# Patient Record
Sex: FEMALE | Race: ASIAN | NOT HISPANIC OR LATINO | URBAN - METROPOLITAN AREA
[De-identification: names, ages, dates, MRNs, and addresses within clinical notes are randomized per-mention and may not be internally consistent; named-entity substitution may affect disease eponyms.]

---

## 2024-04-01 ENCOUNTER — OUTPATIENT (OUTPATIENT)
Dept: OUTPATIENT SERVICES | Facility: HOSPITAL | Age: 59
LOS: 1 days | End: 2024-04-01
Payer: COMMERCIAL

## 2024-04-01 DIAGNOSIS — R10.2 PELVIC AND PERINEAL PAIN: ICD-10-CM

## 2024-04-01 DIAGNOSIS — Z00.8 ENCOUNTER FOR OTHER GENERAL EXAMINATION: ICD-10-CM

## 2024-04-01 DIAGNOSIS — Z12.31 ENCOUNTER FOR SCREENING MAMMOGRAM FOR MALIGNANT NEOPLASM OF BREAST: ICD-10-CM

## 2024-04-01 PROCEDURE — 77063 BREAST TOMOSYNTHESIS BI: CPT | Mod: 26

## 2024-04-01 PROCEDURE — 76856 US EXAM PELVIC COMPLETE: CPT

## 2024-04-01 PROCEDURE — 76856 US EXAM PELVIC COMPLETE: CPT | Mod: 26

## 2024-04-01 PROCEDURE — 77067 SCR MAMMO BI INCL CAD: CPT | Mod: 26

## 2024-04-01 PROCEDURE — 77063 BREAST TOMOSYNTHESIS BI: CPT

## 2024-04-01 PROCEDURE — 76830 TRANSVAGINAL US NON-OB: CPT

## 2024-04-01 PROCEDURE — 77067 SCR MAMMO BI INCL CAD: CPT

## 2024-04-01 PROCEDURE — 76830 TRANSVAGINAL US NON-OB: CPT | Mod: 26

## 2024-04-02 DIAGNOSIS — Z12.31 ENCOUNTER FOR SCREENING MAMMOGRAM FOR MALIGNANT NEOPLASM OF BREAST: ICD-10-CM

## 2024-04-02 DIAGNOSIS — R10.2 PELVIC AND PERINEAL PAIN: ICD-10-CM

## 2024-06-03 PROBLEM — Z00.00 ENCOUNTER FOR PREVENTIVE HEALTH EXAMINATION: Status: ACTIVE | Noted: 2024-06-03

## 2024-06-05 ENCOUNTER — APPOINTMENT (OUTPATIENT)
Dept: NEUROLOGY | Facility: CLINIC | Age: 59
End: 2024-06-05
Payer: COMMERCIAL

## 2024-06-05 VITALS — WEIGHT: 103 LBS | HEIGHT: 60 IN | BODY MASS INDEX: 20.22 KG/M2

## 2024-06-05 VITALS — HEART RATE: 67 BPM | DIASTOLIC BLOOD PRESSURE: 67 MMHG | SYSTOLIC BLOOD PRESSURE: 106 MMHG

## 2024-06-05 DIAGNOSIS — M54.12 RADICULOPATHY, CERVICAL REGION: ICD-10-CM

## 2024-06-05 DIAGNOSIS — M25.519 PAIN IN UNSPECIFIED SHOULDER: ICD-10-CM

## 2024-06-05 DIAGNOSIS — M25.511 PAIN IN RIGHT SHOULDER: ICD-10-CM

## 2024-06-05 PROCEDURE — 99204 OFFICE O/P NEW MOD 45 MIN: CPT

## 2024-06-05 RX ORDER — ASPIRIN 81 MG
81 TABLET,CHEWABLE ORAL
Refills: 0 | Status: ACTIVE | COMMUNITY

## 2024-06-05 RX ORDER — CHOLECALCIFEROL (VITAMIN D3) 10 MCG
TABLET ORAL
Refills: 0 | Status: ACTIVE | COMMUNITY

## 2024-06-05 RX ORDER — COLD-HOT PACK
EACH MISCELLANEOUS
Refills: 0 | Status: ACTIVE | COMMUNITY

## 2024-06-05 RX ORDER — CHROMIUM 200 MCG
TABLET ORAL
Refills: 0 | Status: ACTIVE | COMMUNITY

## 2024-06-05 RX ORDER — ROSUVASTATIN CALCIUM 5 MG/1
5 TABLET, FILM COATED ORAL
Refills: 0 | Status: ACTIVE | COMMUNITY

## 2024-06-05 NOTE — PHYSICAL EXAM

## 2024-06-05 NOTE — ASSESSMENT
[FreeTextEntry1] : Acute on chronic cervical radiculopathy - X-ray of the cervical spine - MRI of the cervical spine - EMG/NCS of the upper extremities - Trial of compound cream.  I have given her some samples and if she finds it helpful she will call back for an extra prescription -A trial of physical therapy  Right shoulder pain-possible shoulder impingement syndrome - Trial of physical therapy - If not better, should consult orthopedics

## 2024-06-05 NOTE — HISTORY OF PRESENT ILLNESS
[FreeTextEntry1] : It's a pleasure to see Ms. ASHLIE MULLEN In the office today. She is a 58 year -  old [place] woman  who presents to the office today for the evaluation of acute on chronic cervical radiculopathy.  Patient reports that she has a history of neck pain with pain radiating down her right arm.  Many years ago she was seen by a doctor in [facility] and only had x-ray of the cervical spine which she was told degenerative changes.  She did go for therapy that helped.  A month ago she started having numbness and radiating pain in the right hand that gradually as send up the arm and last week she woke up with stiffness of the neck as well as pain in the right shoulder.  She denies any significant weakness, numbness, urinary/bowel incontinence.  Patient and her  have been living in [place] for the last 30 years and recently move back to the [facility] just 6 months ago to be with their kids.

## 2024-06-12 ENCOUNTER — APPOINTMENT (OUTPATIENT)
Dept: NEUROLOGY | Facility: CLINIC | Age: 59
End: 2024-06-12
Payer: COMMERCIAL

## 2024-06-12 PROCEDURE — 95886 MUSC TEST DONE W/N TEST COMP: CPT

## 2024-06-12 PROCEDURE — 95911 NRV CNDJ TEST 9-10 STUDIES: CPT

## 2024-07-22 ENCOUNTER — RESULT REVIEW (OUTPATIENT)
Age: 59
End: 2024-07-22

## 2024-07-25 ENCOUNTER — APPOINTMENT (OUTPATIENT)
Dept: NEUROLOGY | Facility: CLINIC | Age: 59
End: 2024-07-25
Payer: COMMERCIAL

## 2024-07-25 VITALS — HEIGHT: 60 IN | BODY MASS INDEX: 20.22 KG/M2 | WEIGHT: 103 LBS

## 2024-07-25 VITALS — SYSTOLIC BLOOD PRESSURE: 113 MMHG | DIASTOLIC BLOOD PRESSURE: 69 MMHG | HEART RATE: 71 BPM

## 2024-07-25 DIAGNOSIS — M25.511 PAIN IN RIGHT SHOULDER: ICD-10-CM

## 2024-07-25 DIAGNOSIS — M54.12 RADICULOPATHY, CERVICAL REGION: ICD-10-CM

## 2024-07-25 PROCEDURE — 99213 OFFICE O/P EST LOW 20 MIN: CPT

## 2024-07-25 NOTE — ASSESSMENT
[FreeTextEntry1] : Acute on chronic cervical radiculopathy-much improved -X-ray, MRI of the cervical spine, EMG/NCS of the upper extremities reviewed and discussed with patient.  Images shown on computer. - Continue physical therapy  Right shoulder pain-possible shoulder impingement syndrome -much improved -Continue physical therapy - If recurrent, should consider seeing orthopedics

## 2024-07-25 NOTE — HISTORY OF PRESENT ILLNESS
[FreeTextEntry1] : Ms. ASHLIE MULLEN returns to the office for follow-up and her prior history and physical have been reviewed and she reports no change since last visit.  She was last seen for the evaluation of acute on chronic cervical radiculopathy.  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  EMG/NCS of the upper extremities showed mild carpal tunnel syndrome on the right which she is not symptomatic off.  She was sent for physical therapy and with only 8 sessions in, she is almost asymptomatic.  Additionally she had pain in the right shoulder but also got better with physical therapy.   She mentions today that she had mild carotid stenosis in the past, and she recently went for carotid ultrasound and regional, but the result of which is not available to us at this time.  She never had any history of stroke or TIA nor does she have feeling of dizziness.  She only did it because she worries about future strokes.     It's a pleasure to see Ms. ASHLIE MULLEN In the office today. She is a 58 year -  old [place] woman  who presents to the office today for the evaluation of acute on chronic cervical radiculopathy.  Patient reports that she has a history of neck pain with pain radiating down her right arm.  Many years ago she was seen by a doctor in [facility] and only had x-ray of the cervical spine which she was told degenerative changes.  She did go for therapy that helped.  A month ago she started having numbness and radiating pain in the right hand that gradually as send up the arm and last week she woke up with stiffness of the neck as well as pain in the right shoulder.  She denies any significant weakness, numbness, urinary/bowel incontinence.  Patient and her  have been living in [facility] for the last 30 years and recently move back to the [facility] just 6 months ago to be with their kids.

## 2025-04-02 ENCOUNTER — APPOINTMENT (OUTPATIENT)
Dept: NEUROLOGY | Facility: CLINIC | Age: 60
End: 2025-04-02
Payer: COMMERCIAL

## 2025-04-02 VITALS — HEIGHT: 60 IN

## 2025-04-02 VITALS
WEIGHT: 108 LBS | SYSTOLIC BLOOD PRESSURE: 112 MMHG | HEART RATE: 71 BPM | DIASTOLIC BLOOD PRESSURE: 70 MMHG | BODY MASS INDEX: 21.09 KG/M2

## 2025-04-02 DIAGNOSIS — M79.602 PAIN IN LEFT ARM: ICD-10-CM

## 2025-04-02 DIAGNOSIS — M79.673 PAIN IN UNSPECIFIED FOOT: ICD-10-CM

## 2025-04-02 PROCEDURE — 99213 OFFICE O/P EST LOW 20 MIN: CPT

## 2025-04-02 RX ORDER — DICLOFENAC SODIUM 20 MG/G
2 SOLUTION TOPICAL 3 TIMES DAILY
Qty: 1 | Refills: 11 | Status: ACTIVE | COMMUNITY
Start: 2025-04-02 | End: 1900-01-01

## 2025-04-08 ENCOUNTER — OUTPATIENT (OUTPATIENT)
Dept: OUTPATIENT SERVICES | Facility: HOSPITAL | Age: 60
LOS: 1 days | End: 2025-04-08
Payer: COMMERCIAL

## 2025-04-08 DIAGNOSIS — Z12.31 ENCOUNTER FOR SCREENING MAMMOGRAM FOR MALIGNANT NEOPLASM OF BREAST: ICD-10-CM

## 2025-04-08 DIAGNOSIS — R92.2 INCONCLUSIVE MAMMOGRAM: ICD-10-CM

## 2025-04-08 PROCEDURE — 77067 SCR MAMMO BI INCL CAD: CPT

## 2025-04-08 PROCEDURE — 77067 SCR MAMMO BI INCL CAD: CPT | Mod: 26

## 2025-04-08 PROCEDURE — 77063 BREAST TOMOSYNTHESIS BI: CPT | Mod: 26

## 2025-04-08 PROCEDURE — 76641 ULTRASOUND BREAST COMPLETE: CPT | Mod: 26,50

## 2025-04-08 PROCEDURE — 77063 BREAST TOMOSYNTHESIS BI: CPT

## 2025-04-08 PROCEDURE — 76641 ULTRASOUND BREAST COMPLETE: CPT | Mod: 50

## 2025-04-09 DIAGNOSIS — R92.2 INCONCLUSIVE MAMMOGRAM: ICD-10-CM

## 2025-04-09 DIAGNOSIS — Z12.31 ENCOUNTER FOR SCREENING MAMMOGRAM FOR MALIGNANT NEOPLASM OF BREAST: ICD-10-CM
